# Patient Record
Sex: FEMALE | Race: WHITE | NOT HISPANIC OR LATINO | URBAN - METROPOLITAN AREA
[De-identification: names, ages, dates, MRNs, and addresses within clinical notes are randomized per-mention and may not be internally consistent; named-entity substitution may affect disease eponyms.]

---

## 2022-12-09 ENCOUNTER — APPOINTMENT (OUTPATIENT)
Dept: URBAN - METROPOLITAN AREA CLINIC 237 | Age: 57
Setting detail: DERMATOLOGY
End: 2022-12-10

## 2022-12-09 DIAGNOSIS — L40.59 OTHER PSORIATIC ARTHROPATHY: ICD-10-CM

## 2022-12-09 DIAGNOSIS — L40.0 PSORIASIS VULGARIS: ICD-10-CM

## 2022-12-09 PROCEDURE — OTHER RECORDS REQUESTED: OTHER

## 2022-12-09 PROCEDURE — OTHER COUNSELING: OTHER

## 2022-12-09 PROCEDURE — OTHER OTHER: OTHER

## 2022-12-09 PROCEDURE — 99204 OFFICE O/P NEW MOD 45 MIN: CPT

## 2022-12-09 PROCEDURE — OTHER TREATMENT REGIMEN: OTHER

## 2022-12-09 PROCEDURE — OTHER PRESCRIPTION: OTHER

## 2022-12-09 PROCEDURE — OTHER MIPS QUALITY: OTHER

## 2022-12-09 PROCEDURE — OTHER PATIENT SPECIFIC COUNSELING: OTHER

## 2022-12-09 RX ORDER — APREMILAST 30 MG/1
ONE TABLET, FILM COATED ORAL BID
Qty: 60 | Refills: 0 | Status: ACTIVE

## 2022-12-09 RX ORDER — TRIAMCINOLONE ACETONIDE 1 MG/G
APPLY CREAM TOPICAL BID
Qty: 30 | Refills: 0 | Status: ERX | COMMUNITY
Start: 2022-12-09

## 2022-12-09 RX ORDER — CLOBETASOL PROPIONATE 0.5 MG/ML
APPLY SOLUTION TOPICAL BID
Qty: 50 | Refills: 0 | Status: ERX | COMMUNITY
Start: 2022-12-09

## 2022-12-09 ASSESSMENT — LOCATION DETAILED DESCRIPTION DERM
LOCATION DETAILED: RIGHT GLENOHUMERAL JOINT
LOCATION DETAILED: LEFT GLENOHUMERAL JOINT
LOCATION DETAILED: RIGHT ELBOW
LOCATION DETAILED: INFERIOR THORACIC SPINE
LOCATION DETAILED: RIGHT THIRD PROXIMAL INTERPHALANGEAL JOINT
LOCATION DETAILED: LEFT ELBOW
LOCATION DETAILED: LEFT SUPERIOR PARIETAL SCALP
LOCATION DETAILED: LEFT THIRD PROXIMAL INTERPHALANGEAL JOINT

## 2022-12-09 ASSESSMENT — BSA PSORIASIS: % BODY COVERED IN PSORIASIS: 2

## 2022-12-09 ASSESSMENT — LOCATION SIMPLE DESCRIPTION DERM
LOCATION SIMPLE: LEFT THIRD PIP
LOCATION SIMPLE: RIGHT THIRD PIP
LOCATION SIMPLE: RIGHT ELBOW
LOCATION SIMPLE: LEFT GLENOHUMERAL
LOCATION SIMPLE: UPPER BACK
LOCATION SIMPLE: RIGHT GLENOHUMERAL
LOCATION SIMPLE: SCALP
LOCATION SIMPLE: LEFT ELBOW

## 2022-12-09 ASSESSMENT — LOCATION ZONE DERM
LOCATION ZONE: SHOULDER
LOCATION ZONE: SCALP
LOCATION ZONE: ARM
LOCATION ZONE: TRUNK
LOCATION ZONE: FINGER

## 2022-12-09 NOTE — PROCEDURE: OTHER
Note Text (......Xxx Chief Complaint.): This diagnosis correlates with the
Other (Free Text): Advised patient to see rheumatologist
Detail Level: Simple
Render Risk Assessment In Note?: no

## 2022-12-09 NOTE — PROCEDURE: TREATMENT REGIMEN
Detail Level: Zone
Action 1: Continue
Other Instructions: Plan Otezla 30 mg bid after starter pack and provided labs normal (BUN and Cr)
Start Regimen: Clobetasol solution apply to the scalp once to twice daily x 2 weeks then 2 week break then repeat the cycle as needed for flare ups\\nTMC 0.1% cream apply to the back and elbows twice daily x  2 weeks then as needed for flare ups
Samples Given: Otezla take as directed (starter pack) patient not to start until we get blood work results

## 2022-12-09 NOTE — PROCEDURE: RECORDS REQUESTED
Providers To Request Records From: PCP
Preface: I requested the records from the following providers.
Detail Level: Zone

## 2022-12-09 NOTE — PROCEDURE: MIPS QUALITY
Detail Level: Detailed
Quality 110: Preventive Care And Screening: Influenza Immunization: Influenza Immunization previously received during influenza season
Quality 226: Preventive Care And Screening: Tobacco Use: Screening And Cessation Intervention: Patient screened for tobacco use and is an ex/non-smoker
Quality 431: Preventive Care And Screening: Unhealthy Alcohol Use - Screening: Documentation of medical reason(s) for not screening for unhealthy alcohol use (e.g., limited life expectancy, other medical reasons)